# Patient Record
Sex: FEMALE | Race: WHITE
[De-identification: names, ages, dates, MRNs, and addresses within clinical notes are randomized per-mention and may not be internally consistent; named-entity substitution may affect disease eponyms.]

---

## 2018-10-02 ENCOUNTER — HOSPITAL ENCOUNTER (OUTPATIENT)
Dept: HOSPITAL 61 - PCVCCLINIC | Age: 73
Discharge: HOME | End: 2018-10-02
Attending: INTERNAL MEDICINE
Payer: MEDICARE

## 2018-10-02 DIAGNOSIS — E78.5: ICD-10-CM

## 2018-10-02 DIAGNOSIS — D56.9: ICD-10-CM

## 2018-10-02 DIAGNOSIS — I49.3: Primary | ICD-10-CM

## 2018-10-02 DIAGNOSIS — R06.02: ICD-10-CM

## 2018-10-02 PROCEDURE — G0463 HOSPITAL OUTPT CLINIC VISIT: HCPCS

## 2018-10-02 PROCEDURE — 80061 LIPID PANEL: CPT

## 2018-10-02 PROCEDURE — 93005 ELECTROCARDIOGRAM TRACING: CPT

## 2018-11-07 ENCOUNTER — HOSPITAL ENCOUNTER (OUTPATIENT)
Dept: HOSPITAL 61 - PCVCIMAG | Age: 73
Discharge: HOME | End: 2018-11-07
Attending: INTERNAL MEDICINE
Payer: MEDICARE

## 2018-11-07 DIAGNOSIS — I49.3: Primary | ICD-10-CM

## 2018-11-07 DIAGNOSIS — R06.09: ICD-10-CM

## 2018-11-07 DIAGNOSIS — R94.31: ICD-10-CM

## 2018-11-07 PROCEDURE — 93325 DOPPLER ECHO COLOR FLOW MAPG: CPT

## 2018-11-07 PROCEDURE — 93351 STRESS TTE COMPLETE: CPT

## 2018-11-07 NOTE — PCVCIMAG
--------------- APPROVED REPORT --------------





Study performed:  11/07/2018 11:47:01



Exam:  Stress Echocardiogram

Indication: Abnormal ekg, , Hyperlipidemia, , Dyspnea

Patient Location: Echo lab

Stress Nurse: Geri Minaya RN

Status: routine



Ht: 5 ft 2 in  

HR: 95 bpm      BP: 130/80 mmHg

Rhythm: NSR



Medical History

Medical History: Hyperlipidemia, Irregular heart beat

Cardiac Risk Factors: Hyperlipidemia

Exercise History: Physically active



Procedure

The patient underwent an Exercise Stress Test using the Ryne 

Protocol. Blood pressure, heart rate, and EKG were monitored.

An Echocardiogram was performed by technician in four stages in quad 

fashion.  At peak stress, four selected images were obtained and 

placed side by side with resting images for comparison.



Stress Test Details

Stress Test:  Exercise stress testing was performed using a Ryne 

protocol.

HR

Resting HR:            95 bpmMax Heart Rate (APMHR): 147 bpm 

Max HR Achieved:  151 bpmTarget HR (85% APMHR): 124 bpm

% of APMHR:         102

HR response to stress: Normal HR response to stress



BP

Resting BP:  130/80 mmHg

Max BP:       180/80 mmHg



ECG

Resting ECG:  Sinus Rhythm, PVC's

Stress ECG:     Sinus Rhythm

ST Change: Normal

Maximum ST Deviation: 0 mm

Arrhythmia:    PVC's

Recovery ECG: Sinus Rhythm

Recovery ST Change: Normal

Recovery ST Deviation: 0 mm

Recovery Arrhythmia: None



Clinical

Reason for Termination: Dyspnea, Maximal effort

Exercise duration: 6 min sec

Highest Stage Achieved: Stage 2: 2.5 mph at 12% grade. 

Exercise capacity: 7.00 METs

Overall Exercise Capacity for Age: Poor

Angina Score: None



Stress ECG Conclusion

Clinical: Non-ischemic

ECG: Non-ischemic

[facility] Treadmill Score is 6.0 which is Low risk.



Pre-Stress Echo

The resting Echocardiogram showed normal left ventricular 

contractility with an estimated Ejection Fraction of about 55-60%. 

Normal wall motion in all segments on baseline images.



Post-Stress Echo

The stress Echocardiogram showed normal left ventricular 

contractility with an estimated Ejection Fraction of about 60-65%. 

Normal augmentation of wall motion in all segments on post stress 

images.



Clinical

No clinical or ECG evidence for ischemia.



Conclusion

Clinical Response:  Non-ischemic

Exercise Capacity:  Below Average

Stress ECG Response:  Non-ischemic

Stress Echo Images:  Non-ischemic

The left ventricle is normal in size and wall thickness in both the 

rest and stress images.

Normal stress echocardiogram with maximal exercise stress. 



Other Information

Study Quality: Good



&lt;Conclusion&gt;

The left ventricle is normal in size and wall thickness in both the 

rest and stress images.

Normal stress echocardiogram with maximal exercise stress.